# Patient Record
Sex: FEMALE | Race: WHITE | NOT HISPANIC OR LATINO | Employment: UNEMPLOYED | URBAN - METROPOLITAN AREA
[De-identification: names, ages, dates, MRNs, and addresses within clinical notes are randomized per-mention and may not be internally consistent; named-entity substitution may affect disease eponyms.]

---

## 2019-04-05 ENCOUNTER — HOSPITAL ENCOUNTER (EMERGENCY)
Facility: MEDICAL CENTER | Age: 24
End: 2019-04-05
Payer: COMMERCIAL

## 2019-04-05 VITALS
HEART RATE: 106 BPM | DIASTOLIC BLOOD PRESSURE: 94 MMHG | RESPIRATION RATE: 19 BRPM | WEIGHT: 157.63 LBS | TEMPERATURE: 98.1 F | OXYGEN SATURATION: 97 % | BODY MASS INDEX: 30.78 KG/M2 | SYSTOLIC BLOOD PRESSURE: 132 MMHG

## 2019-04-05 PROCEDURE — 302449 STATCHG TRIAGE ONLY (STATISTIC)

## 2019-04-06 NOTE — ED NOTES
Pt reports she is greater than 20w pregnant but unsure of exactly OMAIRA. Pt reports sharp abd. Pain. Charge notified. Pt to labor and delivery unit immediatly.

## 2019-04-09 ENCOUNTER — HOSPITAL ENCOUNTER (OUTPATIENT)
Facility: MEDICAL CENTER | Age: 24
End: 2019-04-09
Attending: OBSTETRICS & GYNECOLOGY | Admitting: OBSTETRICS & GYNECOLOGY
Payer: MEDICAID

## 2019-04-09 PROCEDURE — 99201 PR OFFICE/OUTPT VISIT,NEW,LEVL I: CPT | Mod: 25 | Performed by: OBSTETRICS & GYNECOLOGY

## 2019-04-09 PROCEDURE — 76815 OB US LIMITED FETUS(S): CPT | Mod: 26 | Performed by: OBSTETRICS & GYNECOLOGY

## 2019-04-09 NOTE — PROGRESS NOTES
MD L&D progress note     S: 24-year-old  1 para 0 at unknown gestational age.  Presents to triage complaining of back pain.  No vaginal bleeding, no loss of fluid, no fetal movement.    O: VSS afebrile  FHR - 155 bpm  [place] -none  SVE -deferred    Bedside ultrasound shows single uterine pregnancy at 12 weeks and 2 days gestational age with estimated date of delivery 2019.     A: IUP at 12 weeks and 2 days,      P:   No evidence of threatened , no vaginal bleeding, likely back pain associated with pregnancy.  Recommended heat, rest as well as use of Tylenol.    Follow-up as previously scheduled.

## 2019-04-09 NOTE — PROGRESS NOTES
EDC 10/20 12      1135-pt presents from home with c/o back pain, states that she has not had PNC yet but has an appointment [facility], no c/o leaking, bleeding, or uc's, has not felt movement yet, unable to find FHT, TOCO applied, vs taken, TC Dr Callaway, report given, will be in to scan   1156-Dr Callaway in room, scan done, pt is 12 weeks by US, instructed to keep appointment at [facility], discharge order received

## 2019-04-15 ENCOUNTER — APPOINTMENT (OUTPATIENT)
Dept: RADIOLOGY | Facility: MEDICAL CENTER | Age: 24
End: 2019-04-15
Attending: EMERGENCY MEDICINE
Payer: MEDICAID

## 2019-04-15 ENCOUNTER — HOSPITAL ENCOUNTER (EMERGENCY)
Facility: MEDICAL CENTER | Age: 24
End: 2019-04-15
Attending: EMERGENCY MEDICINE
Payer: MEDICAID

## 2019-04-15 VITALS
HEIGHT: 60 IN | OXYGEN SATURATION: 96 % | TEMPERATURE: 98.6 F | DIASTOLIC BLOOD PRESSURE: 85 MMHG | RESPIRATION RATE: 20 BRPM | SYSTOLIC BLOOD PRESSURE: 123 MMHG | BODY MASS INDEX: 31.38 KG/M2 | HEART RATE: 80 BPM | WEIGHT: 159.83 LBS

## 2019-04-15 DIAGNOSIS — N39.0 URINARY TRACT INFECTION WITHOUT HEMATURIA, SITE UNSPECIFIED: ICD-10-CM

## 2019-04-15 DIAGNOSIS — N93.9 VAGINAL BLEEDING: ICD-10-CM

## 2019-04-15 DIAGNOSIS — O41.8X20 SUBCHORIONIC HEMATOMA IN SECOND TRIMESTER, SINGLE OR UNSPECIFIED FETUS: ICD-10-CM

## 2019-04-15 DIAGNOSIS — O46.8X2 SUBCHORIONIC HEMATOMA IN SECOND TRIMESTER, SINGLE OR UNSPECIFIED FETUS: ICD-10-CM

## 2019-04-15 DIAGNOSIS — O20.0 THREATENED MISCARRIAGE IN EARLY PREGNANCY: ICD-10-CM

## 2019-04-15 LAB
ALBUMIN SERPL BCP-MCNC: 3.6 G/DL (ref 3.2–4.9)
ALBUMIN/GLOB SERPL: 1.4 G/DL
ALP SERPL-CCNC: 36 U/L (ref 30–99)
ALT SERPL-CCNC: 37 U/L (ref 2–50)
ANION GAP SERPL CALC-SCNC: 9 MMOL/L (ref 0–11.9)
APPEARANCE UR: CLEAR
APPEARANCE UR: CLEAR
AST SERPL-CCNC: 17 U/L (ref 12–45)
B-HCG SERPL-ACNC: ABNORMAL MIU/ML (ref 0–5)
BACTERIA #/AREA URNS HPF: ABNORMAL /HPF
BACTERIA GENITAL QL WET PREP: NORMAL
BASOPHILS # BLD AUTO: 0.3 % (ref 0–1.8)
BASOPHILS # BLD: 0.05 K/UL (ref 0–0.12)
BILIRUB SERPL-MCNC: 0.2 MG/DL (ref 0.1–1.5)
BILIRUB UR QL STRIP.AUTO: NEGATIVE
BUN SERPL-MCNC: 13 MG/DL (ref 8–22)
CALCIUM SERPL-MCNC: 8.9 MG/DL (ref 8.5–10.5)
CHLORIDE SERPL-SCNC: 105 MMOL/L (ref 96–112)
CO2 SERPL-SCNC: 22 MMOL/L (ref 20–33)
COLOR UR AUTO: YELLOW
COLOR UR: YELLOW
CREAT SERPL-MCNC: 0.45 MG/DL (ref 0.5–1.4)
EOSINOPHIL # BLD AUTO: 0.28 K/UL (ref 0–0.51)
EOSINOPHIL NFR BLD: 1.9 % (ref 0–6.9)
EPI CELLS #/AREA URNS HPF: ABNORMAL /HPF
ERYTHROCYTE [DISTWIDTH] IN BLOOD BY AUTOMATED COUNT: 44.1 FL (ref 35.9–50)
GLOBULIN SER CALC-MCNC: 2.5 G/DL (ref 1.9–3.5)
GLUCOSE SERPL-MCNC: 94 MG/DL (ref 65–99)
GLUCOSE UR QL STRIP.AUTO: NEGATIVE MG/DL
GLUCOSE UR STRIP.AUTO-MCNC: NEGATIVE MG/DL
HCT VFR BLD AUTO: 37.6 % (ref 37–47)
HGB BLD-MCNC: 12.7 G/DL (ref 12–16)
HYALINE CASTS #/AREA URNS LPF: ABNORMAL /LPF
IMM GRANULOCYTES # BLD AUTO: 0.15 K/UL (ref 0–0.11)
IMM GRANULOCYTES NFR BLD AUTO: 1 % (ref 0–0.9)
KETONES UR QL STRIP.AUTO: NEGATIVE MG/DL
KETONES UR STRIP.AUTO-MCNC: NEGATIVE MG/DL
LEUKOCYTE ESTERASE UR QL STRIP.AUTO: NEGATIVE
LEUKOCYTE ESTERASE UR QL STRIP.AUTO: NEGATIVE
LYMPHOCYTES # BLD AUTO: 3.27 K/UL (ref 1–4.8)
LYMPHOCYTES NFR BLD: 22.6 % (ref 22–41)
MCH RBC QN AUTO: 30.4 PG (ref 27–33)
MCHC RBC AUTO-ENTMCNC: 33.8 G/DL (ref 33.6–35)
MCV RBC AUTO: 90 FL (ref 81.4–97.8)
MICRO URNS: ABNORMAL
MONOCYTES # BLD AUTO: 1.25 K/UL (ref 0–0.85)
MONOCYTES NFR BLD AUTO: 8.6 % (ref 0–13.4)
NEUTROPHILS # BLD AUTO: 9.48 K/UL (ref 2–7.15)
NEUTROPHILS NFR BLD: 65.6 % (ref 44–72)
NITRITE UR QL STRIP.AUTO: NEGATIVE
NITRITE UR QL STRIP.AUTO: NEGATIVE
NRBC # BLD AUTO: 0 K/UL
NRBC BLD-RTO: 0 /100 WBC
NUMBER OF RH DOSES IND 8505RD: NORMAL
PH UR STRIP.AUTO: 6 [PH]
PH UR STRIP.AUTO: 6.5 [PH]
PLATELET # BLD AUTO: 282 K/UL (ref 164–446)
PMV BLD AUTO: 9.8 FL (ref 9–12.9)
POTASSIUM SERPL-SCNC: 3.9 MMOL/L (ref 3.6–5.5)
PROT SERPL-MCNC: 6.1 G/DL (ref 6–8.2)
PROT UR QL STRIP: NEGATIVE MG/DL
PROT UR QL STRIP: NEGATIVE MG/DL
RBC # BLD AUTO: 4.18 M/UL (ref 4.2–5.4)
RBC # URNS HPF: ABNORMAL /HPF
RBC UR QL AUTO: ABNORMAL
RBC UR QL AUTO: ABNORMAL
RH BLD: NORMAL
SIGNIFICANT IND 70042: NORMAL
SITE SITE: NORMAL
SODIUM SERPL-SCNC: 136 MMOL/L (ref 135–145)
SOURCE SOURCE: NORMAL
SP GR UR STRIP.AUTO: 1.02
SP GR UR: 1.01
UROBILINOGEN UR STRIP.AUTO-MCNC: 0.2 MG/DL
WBC # BLD AUTO: 14.5 K/UL (ref 4.8–10.8)
WBC #/AREA URNS HPF: ABNORMAL /HPF

## 2019-04-15 PROCEDURE — 80053 COMPREHEN METABOLIC PANEL: CPT

## 2019-04-15 PROCEDURE — 81002 URINALYSIS NONAUTO W/O SCOPE: CPT

## 2019-04-15 PROCEDURE — 76815 OB US LIMITED FETUS(S): CPT

## 2019-04-15 PROCEDURE — 99284 EMERGENCY DEPT VISIT MOD MDM: CPT

## 2019-04-15 PROCEDURE — 86901 BLOOD TYPING SEROLOGIC RH(D): CPT

## 2019-04-15 PROCEDURE — 84702 CHORIONIC GONADOTROPIN TEST: CPT

## 2019-04-15 PROCEDURE — 81001 URINALYSIS AUTO W/SCOPE: CPT

## 2019-04-15 PROCEDURE — 87491 CHLMYD TRACH DNA AMP PROBE: CPT

## 2019-04-15 PROCEDURE — 87591 N.GONORRHOEAE DNA AMP PROB: CPT

## 2019-04-15 PROCEDURE — 85025 COMPLETE CBC W/AUTO DIFF WBC: CPT

## 2019-04-15 RX ORDER — NITROFURANTOIN 25; 75 MG/1; MG/1
100 CAPSULE ORAL 2 TIMES DAILY
Qty: 14 CAP | Refills: 0 | Status: SHIPPED | OUTPATIENT
Start: 2019-04-15 | End: 2019-04-22

## 2019-04-15 ASSESSMENT — LIFESTYLE VARIABLES: DO YOU DRINK ALCOHOL: NO

## 2019-04-15 ASSESSMENT — ENCOUNTER SYMPTOMS: ABDOMINAL PAIN: 1

## 2019-04-15 NOTE — ED TRIAGE NOTES
Chief Complaint   Patient presents with   • Vaginal Bleeding     and cramping since last night   • Pregnancy     approx 15 weeks. G2A1     Ambulatory to triage for above. VSS. Explained triage process, to waiting room. Asked to inform RN if questions or concerns arise.

## 2019-04-16 LAB
C TRACH DNA SPEC QL NAA+PROBE: NEGATIVE
N GONORRHOEA DNA SPEC QL NAA+PROBE: NEGATIVE
SPECIMEN SOURCE: NORMAL

## 2019-04-16 NOTE — ED NOTES
Pt ambs to room, reports bleeding has slowed but still has cramping.  Prior US showed IUP per pt.  POC in progress

## 2019-04-16 NOTE — ED PROVIDER NOTES
ED Provider Note    Scribed for Susannah Petersen M.D. by Elva Cooper. 4/15/2019, 5:51 PM.    Primary care provider: Pcp Pt States None  Means of arrival: Walk-In  History obtained from: Patient  History limited by: None    CHIEF COMPLAINT  Chief Complaint   Patient presents with   • Vaginal Bleeding     and cramping since last night   • Pregnancy     approx 15 weeks. G2A1       HPI  Freda Lara is a 24 year old female G2A1 at 15 weeks by ultrasound who presents to the Emergency Department for light pink vaginal spotting onset yesterday night.  Patient states vaginal bleeding has improved however she is still experiencing associated abdominal cramping. There were no alleviating or exacerbating factors. She notes she had an ultrasound performed one week ago which was normal, and her next pre-nani appointment is on 19 at the [facility] in [place]. She denies any  other medical problems.     REVIEW OF SYSTEMS  Review of Systems   Gastrointestinal: Positive for abdominal pain (cramping).   Genitourinary:        Positive for vaginal bleeding.    All other systems reviewed and are negative.        PAST MEDICAL HISTORY   has a past medical history of Allergy. (Penicillin)    SURGICAL HISTORY  patient denies any surgical history    SOCIAL HISTORY  Social History   Substance Use Topics   • Smoking status: Former Smoker   •     •        History   Drug use: Unknown       FAMILY HISTORY  History reviewed. No pertinent family history.    CURRENT MEDICATIONS  Home Medications     Reviewed by Taran Ayoub R.N. (Registered Nurse) on 04/15/19 at 1525  Med List Status: Complete   Medication Last Dose Status        Patient Slade Taking any Medications                       ALLERGIES  Allergies   Allergen Reactions   • Pcn [Penicillins]        PHYSICAL EXAM  VITAL SIGNS: /83   Pulse 96   Temp 36.7 °C (98.1 °F) (Temporal)   Resp 16   Ht 1.524 m (5')   Wt 72.5 kg (159 lb 13.3 oz)   SpO2 96%   BMI  31.22 kg/m²   Vitals reviewed by myself.  Nursing note and vitals reviewed.  Constitutional: Well-developed and well-nourished. No acute distress.   HENT: Head is normocephalic and atraumatic.  Eyes: extra-ocular movements intact  Cardiovascular: Regular rate and regular rhythm. No murmur heard.  Pulmonary/Chest: Breath sounds normal. No wheezes or rales.   Abdominal: Soft and non-tender. No distention.    Pelvic: Normal external genitalia.  Cervix is pink and closed, there is scant bleeding from the cervical os, no discharge noted  Musculoskeletal: Extremities exhibit normal range of motion without edema or tenderness.   Neurological: Awake and alert  Skin: Skin is warm and dry. No rash.   Physical Exam      DIAGNOSTIC STUDIES /  LABS  Labs Reviewed   CBC WITH DIFFERENTIAL - Abnormal; Notable for the following:        Result Value    WBC 14.5 (*)     RBC 4.18 (*)     Immature Granulocytes 1.00 (*)     Neutrophils (Absolute) 9.48 (*)     Monos (Absolute) 1.25 (*)     Immature Granulocytes (abs) 0.15 (*)     All other components within normal limits    Narrative:     Print Consent?->No   COMP METABOLIC PANEL - Abnormal; Notable for the following:     Creatinine 0.45 (*)     All other components within normal limits    Narrative:     Print Consent?->No   HCG QUANTITATIVE - Abnormal; Notable for the following:     Bhcg 23505.7 (*)     All other components within normal limits    Narrative:     Print Consent?->No   URINALYSIS,CULTURE IF INDICATED - Abnormal; Notable for the following:     Occult Blood Large (*)     All other components within normal limits   URINE MICROSCOPIC (W/UA) - Abnormal; Notable for the following:     Bacteria Moderate (*)     Epithelial Cells Moderate (*)     All other components within normal limits   POC UA - Abnormal; Notable for the following:     POC Blood Large (*)     All other components within normal limits   RH TYPE FOR RHOGAM FROM E.D.    Narrative:     Print Consent?->No   ESTIMATED  GFR    Narrative:     Print Consent?->No   WET PREP   CHLAMYDIA/GC PCR URINE OR SWAB       All labs reviewed by me.      RADIOLOGY  US-OB LIMITED TRANSABDOMINAL   Final Result         1.  Single intrauterine pregnancy of an estimated gestational age of 13 weeks 3 days with an estimated date of delivery of 2019.   2.  Large heterogeneous hemorrhage anterior to the gestational sac, likely subchorionic hemorrhage.        The radiologist's interpretation of all radiological studies have been reviewed by me.      REASSESSMENT  5:53 PM Patient is evaluated. Pelvic exam performed at this time. I informed patient labs and imaging will be performed for further evaluation of symptoms. Patient is understanding and agreeable.    8:33 PM Patient was reevaluated at bedside. She was updated on plan of care. Patient will be discharged at this time with prescription Nitrofurantoin and was given strict return precautions. The patient will return for new or worsening symptoms and is stable at the time of discharge.    COURSE & MEDICAL DECISION MAKING  Nursing notes, VS, PMSFHx reviewed in chart.    Patient is a 24-year-old female who comes in for vaginal bleeding and pregnancy.  Differential diagnosis includes threatened , completed , normal pregnancy, urinary tract infection, bacterial vaginosis.  Diagnostic workup includes labs and pelvic ultrasound.    Patient's initial vitals within normal limits and abdominal exam is benign.  Labs returned and are unremarkable.  Rh is positive and therefore RhoGam is not indicated.  OB ultrasound demonstrates a single intrauterine pregnancy of 13 weeks with fetal heart rate at 155.  Patient is noted to have a subchorionic hemorrhage and I advised her that this is likely cause of her bleeding and discomfort, she is advised that this does put her at increased risk for miscarriage and she should follow-up with her OB, patient has a follow-up appointment in 2 days.   Urinalysis also notable for bacteria as patient is pregnant she will be started on Macrobid.  Patient is then given strict return precautions and discharged home in stable condition.      DISPOSITION:  Patient will be discharged home in stable condition.    FOLLOW UP:  OB doctor            OUTPATIENT MEDICATIONS:  Discharge Medication List as of 4/15/2019  8:54 PM      START taking these medications    Details   nitrofurantoin monohyd macro (MACROBID) 100 MG Cap Take 1 Cap by mouth 2 times a day for 7 days., Disp-14 Cap, R-0, Print Rx Paper                  FINAL IMPRESSION  1. Vaginal bleeding    2. Urinary tract infection without hematuria, site unspecified    3. Subchorionic hematoma in second trimester, single or unspecified fetus    4. Threatened miscarriage in early pregnancy          Elva BHATTI (Scribe), am scribing for, and in the presence of, Susannah Petersen M.D..    Electronically signed by: Elva Cooper (Scribe), 4/15/2019    ISusannah M.D. personally performed the services described in this documentation, as scribed by Elva Cooper in my presence, and it is both accurate and complete.C    The note accurately reflects work and decisions made by me.  Susannah Petersen  4/16/2019  12:51 AM

## 2019-04-16 NOTE — ED NOTES
Discharge instructions given to patient, prescriptions provided, a verbal understanding of all instructions was stated. Pt preferred to walk out accompanied by family VSS,  all belongings accounted for. Pt [facility].

## 2019-05-16 ENCOUNTER — HOSPITAL ENCOUNTER (EMERGENCY)
Facility: MEDICAL CENTER | Age: 24
End: 2019-05-16
Attending: EMERGENCY MEDICINE
Payer: MEDICAID

## 2019-05-16 VITALS
OXYGEN SATURATION: 98 % | TEMPERATURE: 97.2 F | RESPIRATION RATE: 18 BRPM | SYSTOLIC BLOOD PRESSURE: 128 MMHG | BODY MASS INDEX: 32.29 KG/M2 | DIASTOLIC BLOOD PRESSURE: 72 MMHG | WEIGHT: 164.46 LBS | HEART RATE: 85 BPM | HEIGHT: 60 IN

## 2019-05-16 DIAGNOSIS — J06.9 UPPER RESPIRATORY TRACT INFECTION, UNSPECIFIED TYPE: ICD-10-CM

## 2019-05-16 PROCEDURE — 99283 EMERGENCY DEPT VISIT LOW MDM: CPT

## 2019-05-16 RX ORDER — FLUTICASONE PROPIONATE 50 MCG
2 SPRAY, SUSPENSION (ML) NASAL
Qty: 1 BOTTLE | Refills: 1 | Status: SHIPPED | OUTPATIENT
Start: 2019-05-16

## 2019-05-16 NOTE — ED TRIAGE NOTES
Chief Complaint   Patient presents with   • Cold Symptoms     started four days ago     /76   Pulse 92   Temp 36.1 °C (97 °F) (Temporal)   Resp (!) 22   Ht 1.524 m (5')   Wt 74.6 kg (164 lb 7.4 oz)   SpO2 96%   BMI 32.12 kg/m²     Pt reports is about 24 weeks pregnant.

## 2019-05-17 NOTE — ED NOTES
Seen by ERP. Discharge instructions provided.  Pt verbalized the understanding of discharge instructions to follow up with PCP and to return to ER if condition worsens.  Pt ambulated out of ER without difficulty. RX -1.

## 2019-05-17 NOTE — ED PROVIDER NOTES
ED Provider Note    CHIEF COMPLAINT  Chief Complaint   Patient presents with   • Cold Symptoms     started four days ago       HPI  Freda Lara is a 24 y.o. female who presents planing of runny nose ingestion for the last 4 days.  She has not had any fevers or chills.  She denies any shortness of breath.  She currently is 24 weeks pregnant and is not having any abdominal pain dysuria vaginal bleeding or leakage of fluid.  She does not smoke.  She does not have any severe headache neck stiffness voice changes or facial pain.  She has no sore throat.    REVIEW OF SYSTEMS  See HPI for further details.  Positive cough runny nose and congestion no vaginal bleeding dysuria leakage of fluid    PAST MEDICAL HISTORY  Past Medical History:   Diagnosis Date   • Allergy        FAMILY HISTORY  History reviewed. No pertinent family history.    SOCIAL HISTORY  Social History     Social History   • Marital status: Single     Spouse name: N/A   • Number of children: N/A   • Years of education: N/A     Social History Main Topics   • Smoking status: Former Smoker   • Smokeless tobacco: Not on file   • Alcohol use No   • Drug use: No   • Sexual activity: Not on file      Comment: school 11th.      Other Topics Concern   • Not on file     Social History Narrative   • No narrative on file       SURGICAL HISTORY  No past surgical history on file.    CURRENT MEDICATIONS  Home Medications    **Home medications have not yet been reviewed for this encounter**         ALLERGIES  Allergies   Allergen Reactions   • Bactrim [Sulfamethoxazole W-Trimethoprim]    • Pcn [Penicillins]        PHYSICAL EXAM  VITAL SIGNS: /76   Pulse 92   Temp 36.1 °C (97 °F) (Temporal)   Resp (!) 22   Ht 1.524 m (5')   Wt 74.6 kg (164 lb 7.4 oz)   SpO2 96%   BMI 32.12 kg/m²  Room air O2: 96    Constitutional: Well developed, Well nourished, No acute distress, Non-toxic appearance.   HENT: Normocephalic, Atraumatic, Bilateral external ears normal,  Oropharynx moist, No oral exudates, Nose has some rhinorrhea with mucosal edema and posterior nasopharyngeal drainage no sinus tenderness to percussion  Eyes: PERRLA, EOMI, Conjunctiva normal, No discharge.   Neck: Normal range of motion, No tenderness, Supple, No stridor.   Cardiovascular: Regular rate and rhythm no murmurs rubs or gallops  Thorax & Lungs: There to auscultation bilaterally without wheezes rales or rhonchi  Abdomen: Gravid nontender bowel sounds normal, Soft, No tenderness, No masses, No pulsatile masses.   Skin: Warm, Dry, No erythema, No rash.   Back: No tenderness, No CVA tenderness.   Extremities: Intact distal pulses, No tenderness, No cyanosis, No clubbing.  Negative edema  Neurologic: Alert & oriented x 3, Normal motor function, Normal sensory function, No focal deficits noted.         COURSE & MEDICAL DECISION MAKING  Pertinent Labs & Imaging studies reviewed. (See chart for details)  Differential diagnosis: Allergies versus URI.    The patient will be discharged home with Flonase nasal spray and is to keep her prenatal OB appointments in [place].  She is to return to [facility] or [facility] for any abdominal pain tenderness or leakage of fluid.  If her cold symptoms get worse she should call her OB/GYN.  I explained to her that we did not have any OB services here at [facility] and that if she did have any worsening symptoms she should go to either [place] or [facility].    Current Outpatient Prescriptions   Medication Sig Dispense Refill   • fluticasone (FLONASE) 50 MCG/ACT nasal spray Spray 2 Sprays in nose 1 time daily as needed. 1 Bottle 1       your OBGYN    Call in 1 day  for recheck, As needed, If symptoms worsen        FINAL IMPRESSION  1. Upper respiratory tract infection, unspecified type            Electronically signed by: Almaz Davison, 5/16/2019 5:19 PM

## 2021-12-01 NOTE — DISCHARGE INSTRUCTIONS
You have something called a subchorionic hemorrhage, this can increase your risk for miscarriage, please follow-up with your obstetrician  
Leyla, Viviana Gusman, Kong Hurtado, Bela

## 2022-11-27 ENCOUNTER — HOSPITAL ENCOUNTER (EMERGENCY)
Facility: MEDICAL CENTER | Age: 27
End: 2022-11-27
Attending: EMERGENCY MEDICINE
Payer: COMMERCIAL

## 2022-11-27 VITALS
HEART RATE: 85 BPM | SYSTOLIC BLOOD PRESSURE: 125 MMHG | WEIGHT: 159.83 LBS | RESPIRATION RATE: 16 BRPM | TEMPERATURE: 96.9 F | DIASTOLIC BLOOD PRESSURE: 75 MMHG | OXYGEN SATURATION: 97 % | HEIGHT: 60 IN | BODY MASS INDEX: 31.38 KG/M2

## 2022-11-27 LAB
APPEARANCE UR: CLEAR
BILIRUB UR QL STRIP.AUTO: NEGATIVE
CANDIDA DNA VAG QL PROBE+SIG AMP: NEGATIVE
COLOR UR: YELLOW
G VAGINALIS DNA VAG QL PROBE+SIG AMP: NEGATIVE
GLUCOSE UR STRIP.AUTO-MCNC: NEGATIVE MG/DL
HCG UR QL: NEGATIVE
KETONES UR STRIP.AUTO-MCNC: ABNORMAL MG/DL
LEUKOCYTE ESTERASE UR QL STRIP.AUTO: NEGATIVE
MICRO URNS: ABNORMAL
NITRITE UR QL STRIP.AUTO: NEGATIVE
PH UR STRIP.AUTO: 7 [PH] (ref 5–8)
PROT UR QL STRIP: NEGATIVE MG/DL
RBC UR QL AUTO: NEGATIVE
SP GR UR STRIP.AUTO: 1.03
T VAGINALIS DNA VAG QL PROBE+SIG AMP: NEGATIVE
UROBILINOGEN UR STRIP.AUTO-MCNC: 0.2 MG/DL

## 2022-11-27 PROCEDURE — 87510 GARDNER VAG DNA DIR PROBE: CPT

## 2022-11-27 PROCEDURE — 99284 EMERGENCY DEPT VISIT MOD MDM: CPT

## 2022-11-27 PROCEDURE — 81003 URINALYSIS AUTO W/O SCOPE: CPT

## 2022-11-27 PROCEDURE — 87480 CANDIDA DNA DIR PROBE: CPT

## 2022-11-27 PROCEDURE — 87591 N.GONORRHOEAE DNA AMP PROB: CPT

## 2022-11-27 PROCEDURE — 87660 TRICHOMONAS VAGIN DIR PROBE: CPT

## 2022-11-27 PROCEDURE — 81025 URINE PREGNANCY TEST: CPT

## 2022-11-27 PROCEDURE — 87491 CHLMYD TRACH DNA AMP PROBE: CPT

## 2022-11-27 ASSESSMENT — ENCOUNTER SYMPTOMS
NAUSEA: 0
BACK PAIN: 0
HEADACHES: 0
FEVER: 0
COUGH: 0
VOMITING: 0

## 2022-11-27 NOTE — ED TRIAGE NOTES
Chief Complaint   Patient presents with    Foreign Body in Vagina     Pt states she has had a tampon stuck in her vagina for approx 3 days and is unable to remove it     BP (!) 140/91   Pulse 95   Temp 36.1 °C (96.9 °F) (Temporal)   Resp 16   Ht 1.524 m (5')   Wt 72.5 kg (159 lb 13.3 oz)   SpO2 97%   BMI 31.22 kg/m²     Pt here for above cc  Believes to be just one tampon possibly stuck    Pt to lobby, educated on triage process

## 2022-11-27 NOTE — ED NOTES
Pt ambulated to [facility] from lobby with steady gait.  On monitor, call light in reach. Chart up for ERP.

## 2022-11-27 NOTE — ED PROVIDER NOTES
ED Provider Note    ED Provider Note    Primary care provider: Pcp Pt States None  Means of arrival: POV  History obtained from: patient  History limited by: NOne    CHIEF COMPLAINT  Chief Complaint   Patient presents with    Foreign Body in Vagina     Pt states she has had a tampon stuck in her vagina for approx 3 days and is unable to remove it       HPI  Freda Lara is a 27 y.o. female who presents to the Emergency Department with a chief concern of a retained foreign body.  Patient states that 3 or possibly 4 days ago, she placed a tampon and has not been able to find it.  She is concerned that it still there.  There is a possibility that she is pregnant.  She is also been having some vaginal discharge as well as lower abdominal bloating.  No fever.  No vomiting.    REVIEW OF SYSTEMS  Review of Systems   Constitutional:  Negative for fever.   HENT:  Negative for congestion.    Respiratory:  Negative for cough.    Gastrointestinal:  Negative for nausea and vomiting.   Genitourinary:  Positive for frequency.   Musculoskeletal:  Negative for back pain.   Neurological:  Negative for headaches.   All other systems reviewed and are negative.    PAST MEDICAL HISTORY   has a past medical history of Allergy.    SURGICAL HISTORY  patient denies any surgical history    SOCIAL HISTORY  Social History     Tobacco Use    Smoking status: Every Day     Types: Cigarettes   Substance Use Topics    Alcohol use: Yes     Comment: occasionally    Drug use: No      Social History     Substance and Sexual Activity   Drug Use No       FAMILY HISTORY  History reviewed. No pertinent family history.    CURRENT MEDICATIONS  Home Medications       Reviewed by Maureen Ho R.N. (Registered Nurse) on 11/27/22 at 0438  Med List Status: Partial     Medication Last Dose Status   fluticasone (FLONASE) 50 MCG/ACT nasal spray  Active                    ALLERGIES  Allergies   Allergen Reactions    Bactrim [Sulfamethoxazole  W-Trimethoprim]     Pcn [Penicillins]        PHYSICAL EXAM  VITAL SIGNS: /75   Pulse 85   Temp 36.1 °C (96.9 °F) (Temporal)   Resp 16   Ht 1.524 m (5')   Wt 72.5 kg (159 lb 13.3 oz)   SpO2 97%   BMI 31.22 kg/m²   Vitals reviewed.  Constitutional: Patient is oriented to person, place, and time. Appears well-developed and well-nourished. No distress.    Head: Normocephalic and atraumatic.   Mouth/Throat: Oropharynx is clear and moist  Eyes: Conjunctivae are normal. Pupils are equal, round  Neck: Normal range of motion.  Cardiovascular: Normal rate, regular rhythm and normal heart sounds.   Pulmonary/Chest: Effort normal and breath sounds normal. No respiratory distress, no wheezes  Abdominal: Soft. Bowel sounds are normal. There is no tenderness. No rebound or guarding, or peritoneal signs.   : Normal external female genitalia.  No CMT.  No on speculum exam or on bimanual exam foreign bodies.  Small amount of what appears to be physiologic discharge.  No cervical lesions.  No adnexal tenderness.  Musculoskeletal: No edema and no tenderness.   Neurological: No focal deficits.   Skin: Skin is warm and dry. No erythema. No pallor.   Psychiatric: Patient has a normal mood and affect.     LABS  Results for orders placed or performed during the hospital encounter of 11/27/22   URINALYSIS    Specimen: Urine, Clean Catch   Result Value Ref Range    Color Yellow     Character Clear     Specific Gravity 1.027 <1.035    Ph 7.0 5.0 - 8.0    Glucose Negative Negative mg/dL    Ketones Trace (A) Negative mg/dL    Protein Negative Negative mg/dL    Bilirubin Negative Negative    Urobilinogen, Urine 0.2 Negative    Nitrite Negative Negative    Leukocyte Esterase Negative Negative    Occult Blood Negative Negative    Micro Urine Req see below    BETA-HCG QUALITATIVE URINE   Result Value Ref Range    Beta-Hcg Urine Negative Negative   VAGINAL PATHOGENS DNA PANEL   Result Value Ref Range    Candida species DNA Probe  Negative Negative    Trichamonas vaginalis DNA Probe Negative Negative    Gardnerella vaginalis DNA Probe Negative Negative       All labs reviewed by me.    COURSE & MEDICAL DECISION MAKING  Pertinent Labs & Imaging studies reviewed. (See chart for details)    Obtained and reviewed past medical records.  Patient's last encounter was for cold symptoms in May 2019.    7:14 AM - Patient seen and examined at bedside.  This is a pleasant 27-year-old female.  She presents with concern for vaginal foreign body.  She is also had some vaginal discharge and some bloating.  She also reports possibility of pregnancy.  My pelvic exam, both with bimanual exam and speculum exam, I do not identify any vaginal foreign body.  Patient is relieved at this.  I did obtained swabs and urine analysis as well as pregnancy test  based on her presenting symptoms.    9:20 AM I am notified by nursing staff, that the patient has absconded from the ED.  Her labs were not yet resulted at the time     9:28 AM pelvic swabs negative.  Pregnancy test negative.  Urine analysis was unrevealing.  GC chlamydia still pending but will not be available today..      FINAL IMPRESSION  1.  Evaluation for vaginal foreign body, none detected  2.  Vaginal discharge  3.  Eloped from the ED

## 2022-11-27 NOTE — ED NOTES
Pt left without discharge papers and states she will check my chart for her results. ERP notified.

## 2022-11-28 LAB
C TRACH DNA GENITAL QL NAA+PROBE: NEGATIVE
N GONORRHOEA DNA GENITAL QL NAA+PROBE: NEGATIVE
SPECIMEN SOURCE: NORMAL